# Patient Record
Sex: MALE | URBAN - METROPOLITAN AREA
[De-identification: names, ages, dates, MRNs, and addresses within clinical notes are randomized per-mention and may not be internally consistent; named-entity substitution may affect disease eponyms.]

---

## 2019-11-06 ENCOUNTER — TELEPHONE (OUTPATIENT)
Dept: TRANSPLANT | Facility: CLINIC | Age: 34
End: 2019-11-06

## 2019-11-06 NOTE — TELEPHONE ENCOUNTER
"Krunal Albert called and stated that he is interested in becoming a living donor for his father, Krunal Albert.  Medical and social history obtained.  Patient reports that he is 5'9", ?350#, BMI 51.7. Informed that he exceeds our weight criteria and testing can not begin until he reaches 220#. All questions were answered and patient agreed and verbalized understanding.   "